# Patient Record
(demographics unavailable — no encounter records)

---

## 2024-12-20 NOTE — PHYSICAL EXAM
[General Appearance - In No Acute Distress] : in no acute distress [PERRL With Normal Accommodation] : pupils were equal in size, round, and reactive to light [Examination Of The Oral Cavity] : the lips and gums were normal [Oropharynx] : the oropharynx was normal [] : the neck was supple [Respiration, Rhythm And Depth] : normal respiratory rhythm and effort [Auscultation Breath Sounds / Voice Sounds] : lungs were clear to auscultation bilaterally [Chest Palpation] : palpation of the chest revealed no abnormalities [Heart Rate And Rhythm] : heart rate was normal and rhythm regular [Heart Sounds] : normal S1 and S2 [Murmurs] : no murmurs [Edema] : there was no peripheral edema [Bowel Sounds] : normal bowel sounds [Abdomen Soft] : soft [Abdomen Tenderness] : non-tender [No Spinal Tenderness] : no spinal tenderness [Musculoskeletal - Swelling] : no joint swelling seen [Motor Tone] : muscle strength and tone were normal [FreeTextEntry1] : hyperpigentation forhead

## 2024-12-20 NOTE — ASSESSMENT
[FreeTextEntry1] : A and P  37  y old M with PMH of M with PMH of HTN, Pre DM, MADELINE, Depression Anxiety followed by Behavioral health , with Recurrent pain and swelling with L 1 MTP pain and swelling evaluation for Gout was negative with mildly increased uric acid at 7 had Foot x ray did not show effusion or erosion mild plantar and hill spur  -evaluate for inflammatory arthritis check ESR, CRP, CCP , RF and HLA B 27 also for seronegative arthritis, also repeat Uric acid as 1st MTP involvement -follow up 2-3 weeks

## 2024-12-20 NOTE — HISTORY OF PRESENT ILLNESS
[FreeTextEntry1] : CC: joint pain  HPI: 37 y old M with PMH of HTN, Pre DM, MADELINE , Depresson Anxiety followed by Behavioral health was on medications in the past , has follow up with psychiatry   L foot pain and swelling after massaging with golf ball usually does for History fasciitis , evaluated by Podiatry vanesa Meloxicam, orthotics, had steroid injection , seen at [facility] urgent care  L foot  4/25/23 no erosion no effusion,  small planar and posterior calcaneal spurs  followed by Meagan Grubbs ,was checked for gout has been normal  last time 9/24  suggested evaluation by Rheumatology  every few month pain and swelling 1st Toe L foot , also BL planar fasciitis uric acid 6/25/24 7.3 for last 2 month bl knee pain especially with walking up and down stairs, no morning stiffness occasional lower back pain mostly during exercise  Has been check for STD and HIV , RPR all was negative 2023- not sexually active since then, hep B immune   no history of skin psoriasis, no uveitis or scleritis, no recurrent infections, no recent urinary tract infection, no sob no chest pain no oral ulcers, Childhood asthma, no trouble swallowing no blood in urine or stool   ALL NKDA, seasonal pollen and dust and nickel  SH toxic habits  FH : Sister has Gout, no known history of psoriasis or other autoimmune disease

## 2024-12-31 NOTE — ASSESSMENT
[FreeTextEntry1] : A and P  37  y old M with PMH of M with PMH of HTN, Pre DM, MADELINE, Depression Anxiety followed by Behavioral health , with Recurrent pain and swelling with L 1 MTP pain and swelling evaluation for Gout was negative with mildly increased uric acid at 7 had Foot x ray did not show effusion or erosion mild plantar and hill spur  -evaluated for inflammatory arthritis check  negative CCP , mildly positive RF and negative HLA B 27  -increased inflammatory markers ESR 53 and CRP 8 , can be not specific discussed with the patient , will monitor for new symptoms and inflammatory arthritis , follow up with PMD  -no synovitis and symptoms not suggestive of active inflammatory arthritis , Pelvic x ray  12/20/24 no sig changes of SI joints and no sig change of hip joints  -discussed importance of weight loss and muscle strengthening exercise , has been on Ozempic in the past follow up with PMD  -follow up 2-3 month

## 2024-12-31 NOTE — HISTORY OF PRESENT ILLNESS
[___ Week(s) Ago] : [unfilled] week(s) ago [FreeTextEntry1] : 12/31/24  follow up today no new symptoms since last visit bl knee pain mostly with walking up and down stairs, occasional back pain persistent intermittent L toe pain no joint swelling or morning stiffness

## 2025-03-25 NOTE — ASSESSMENT
[FreeTextEntry1] : A and P  37  y old M with PMH of M with PMH of HTN, Pre DM, MADELINE, Depression Anxiety followed by Behavioral health , with Recurrent pain and swelling with L 1 MTP pain and swelling evaluation for Gout was negative with mildly increased uric acid at 7 had Foot x ray did not show effusion or erosion mild plantar and hill spur  -evaluated for inflammatory arthritis check  negative CCP , mildly positive RF 20  and negative HLA B 27  -increased inflammatory markers ESR 53 and CRP 8 , can be not specific discussed with the patient , will monitor for new symptoms and inflammatory arthritis , follow up with PMD , update blood work  -no synovitis and symptoms not suggestive of active inflammatory arthritis , Pelvic x ray  12/20/24 no sig changes of SI joints and no sig change of hip joints  -back pain worse after moving furniture partially improved but also has intermittent stiffness of the back advised to take NSAID for one week and then as needed , if no improvement will consider Pelvis MRI then -discussed importance of muscle strengthening exercise for upper and lower back and knee , has done PT in the past for R knee and BL foot  -discussed importance of weight loss and muscle strengthening exercise , has been on Ozempic in the past follow up with PMD  -follow up  6-8 weeks

## 2025-03-25 NOTE — HISTORY OF PRESENT ILLNESS
[___ Week(s) Ago] : [unfilled] week(s) ago [FreeTextEntry1] : 3/25/25 stiffness of knee and also back intermittent no uveitis, or scleritis , no psoriasis, no oral ulcers negative HLA B 27 , RF mildly positive 22 and negative CCP increased ESR and CrP Pelvic x ray no sig change of SI and hip joints  12/31/24  follow up today no new symptoms since last visit bl knee pain mostly with walking up and down stairs, occasional back pain persistent intermittent L toe pain no joint swelling or morning stiffness